# Patient Record
Sex: FEMALE
[De-identification: names, ages, dates, MRNs, and addresses within clinical notes are randomized per-mention and may not be internally consistent; named-entity substitution may affect disease eponyms.]

---

## 2022-07-09 ENCOUNTER — NURSE TRIAGE (OUTPATIENT)
Dept: OTHER | Facility: CLINIC | Age: 32
End: 2022-07-09

## 2022-07-09 NOTE — TELEPHONE ENCOUNTER
Subjective: Caller states \"I've been cramping\"     Current Symptoms: Has been constipated for the past several days. Took Miralax and has been having some bowel movements. Her doctor prescribed ducusate. Pain in lower left abdomen off/on cramping. No urinary complaints, No blood in urine. Juice makes pain worse, drinking water or defecating makes it better. No radiation of pain. Is 12 weeks pregnant via invitro and recently had an occult bleed but was checked 4 days ago and told everything was ok    Onset: 3 days ago; intermittent    Associated Symptoms: NA    Pain Severity: 5/10; sharp; intermittent    Temperature: not feverish, hasn't checked     What has been tried: miralax,docusate    LMP: N/A Pregnant: Yes     Recommended disposition: Go to ED Now    Care advice provided, patient verbalizes understanding; denies any other questions or concerns; instructed to call back for any new or worsening symptoms. Patient/caller agrees to proceed to [facility] Emergency [facility]    This triage is a result of a call to [address] [place] [address]. Please do not respond to the triage nurse through this encounter. Any subsequent communication should be directly with the patient.         Reason for Disposition   MODERATE-SEVERE abdominal pain (e.g., interferes with normal activities, awakens from sleep)    Protocols used: PREGNANCY - ABDOMINAL PAIN LESS THAN 20 WEEKS EGA-ADULT-